# Patient Record
Sex: FEMALE | Race: WHITE | NOT HISPANIC OR LATINO | Employment: OTHER | URBAN - METROPOLITAN AREA
[De-identification: names, ages, dates, MRNs, and addresses within clinical notes are randomized per-mention and may not be internally consistent; named-entity substitution may affect disease eponyms.]

---

## 2017-05-22 PROBLEM — M81.0 AGE-RELATED OSTEOPOROSIS WITHOUT CURRENT PATHOLOGICAL FRACTURE: Status: ACTIVE | Noted: 2017-05-22

## 2017-05-22 PROBLEM — Z78.9 INTOLERANCE OF ORAL BISPHOSPHONATE THERAPY: Status: ACTIVE | Noted: 2017-05-22

## 2017-07-26 ENCOUNTER — LAB VISIT (OUTPATIENT)
Dept: LAB | Facility: HOSPITAL | Age: 82
End: 2017-07-26
Attending: INTERNAL MEDICINE
Payer: MEDICARE

## 2017-07-26 DIAGNOSIS — D64.9 ANEMIA, UNSPECIFIED TYPE: ICD-10-CM

## 2017-07-26 DIAGNOSIS — D64.9 ANEMIA, UNSPECIFIED TYPE: Primary | ICD-10-CM

## 2017-07-26 LAB
ABO + RH BLD: NORMAL
BLD GP AB SCN CELLS X3 SERPL QL: NORMAL

## 2017-07-26 PROCEDURE — 86850 RBC ANTIBODY SCREEN: CPT

## 2017-07-26 PROCEDURE — 36415 COLL VENOUS BLD VENIPUNCTURE: CPT

## 2017-07-26 PROCEDURE — 86900 BLOOD TYPING SEROLOGIC ABO: CPT

## 2017-07-26 PROCEDURE — 86920 COMPATIBILITY TEST SPIN: CPT

## 2017-07-26 PROCEDURE — P9021 RED BLOOD CELLS UNIT: HCPCS

## 2017-07-26 RX ORDER — SODIUM CHLORIDE 0.9 % (FLUSH) 0.9 %
10 SYRINGE (ML) INJECTION
Status: ACTIVE | OUTPATIENT
Start: 2017-07-26

## 2017-07-28 ENCOUNTER — INFUSION (OUTPATIENT)
Dept: INFUSION THERAPY | Facility: HOSPITAL | Age: 82
End: 2017-07-28
Attending: INTERNAL MEDICINE
Payer: MEDICARE

## 2017-07-28 VITALS
DIASTOLIC BLOOD PRESSURE: 57 MMHG | BODY MASS INDEX: 23.65 KG/M2 | SYSTOLIC BLOOD PRESSURE: 115 MMHG | HEART RATE: 76 BPM | RESPIRATION RATE: 20 BRPM | OXYGEN SATURATION: 97 % | WEIGHT: 151 LBS | TEMPERATURE: 97 F

## 2017-07-28 DIAGNOSIS — D64.9 ANEMIA, UNSPECIFIED TYPE: ICD-10-CM

## 2017-07-28 DIAGNOSIS — R09.89 RALES: Primary | ICD-10-CM

## 2017-07-28 LAB
BLD PROD TYP BPU: NORMAL
BLD PROD TYP BPU: NORMAL
BLOOD UNIT EXPIRATION DATE: NORMAL
BLOOD UNIT EXPIRATION DATE: NORMAL
BLOOD UNIT TYPE CODE: 5100
BLOOD UNIT TYPE CODE: 5100
BLOOD UNIT TYPE: NORMAL
BLOOD UNIT TYPE: NORMAL
CODING SYSTEM: NORMAL
CODING SYSTEM: NORMAL
DISPENSE STATUS: NORMAL
DISPENSE STATUS: NORMAL
TRANS ERYTHROCYTES VOL PATIENT: NORMAL ML
TRANS ERYTHROCYTES VOL PATIENT: NORMAL ML

## 2017-07-28 PROCEDURE — 96374 THER/PROPH/DIAG INJ IV PUSH: CPT

## 2017-07-28 PROCEDURE — P9021 RED BLOOD CELLS UNIT: HCPCS

## 2017-07-28 PROCEDURE — 63600175 PHARM REV CODE 636 W HCPCS: Performed by: INTERNAL MEDICINE

## 2017-07-28 PROCEDURE — 36430 TRANSFUSION BLD/BLD COMPNT: CPT

## 2017-07-28 RX ORDER — FUROSEMIDE 10 MG/ML
40 INJECTION INTRAMUSCULAR; INTRAVENOUS ONCE
Status: COMPLETED | OUTPATIENT
Start: 2017-07-28 | End: 2017-07-28

## 2017-07-28 RX ADMIN — FUROSEMIDE 40 MG: 10 INJECTION, SOLUTION INTRAMUSCULAR; INTRAVENOUS at 01:07

## 2017-07-28 NOTE — PLAN OF CARE
Problem: Health Knowledge, Opportunity to Enhance (Adult,NICU,[place],Obstetrics,Pediatric)  Goal: Knowledgeable about Health Subject/Topic  Patient will demonstrate the desired outcomes by discharge/transition of care.  Outcome: Outcome(s) achieved Date Met: 17 0913   Health Knowledge, Opportunity to Enhance (Adult,NICU,[place],Obstetrics,Pediatric)   Knowledgeable about Health Subject/Topic achieves outcome

## 2017-07-28 NOTE — PROGRESS NOTES
tolerated blood transfusion well   More talkative  Color improved.    Report called to April at the [facility]   Awaiting transportation from the nursing [facility]

## 2017-07-28 NOTE — PROGRESS NOTES
Pt cleaned of urine diaper saturated.  Clothing wet.  Skin cleansed new daiper applied with hospital gown

## 2017-07-28 NOTE — PATIENT INSTRUCTIONS
Understanding Blood and Blood Components  Blood is a fluid that flows throughout the body in blood vessels. Blood is needed for life. Blood carries oxygen and nutrients to your organs and tissues and helps remove waste. Blood also helps you fight infections and heal from injuries. This sheet tells you more about blood and its important role in your body.           Blood cells are carried in plasma through the bodys blood vessels.          Blood cells are made in bone marrow, located in the center of bones.      What are the components of blood?  Blood can be broken down into different parts (components). These components include red blood cells, white blood cells, platelets, and plasma.  · Red blood cells (RBCs) carry oxygen to the body. Each RBC lives for about 4 months. RBCs contain a protein called hemoglobin. Hemoglobin allows RBCs to  oxygen from the lungs. Iron is needed to make hemoglobin.  · White blood cells (WBCs) are part of the bodys immune system. WBCs help fight infections and diseases. There are different types of WBCs. These include neutrophils, lymphocytes, monoctyes, eosinophils, and basophils. WBCs live for days, months, or years depending on the specific type.  · Platelets are cells that help with clotting. When you have a cut or bruise, platelets come together to form a clot or plug. This helps to control bleeding, so your body doesnt lose too much blood. Platelets live in the body for about 7 to 10 days.  · Plasma is the liquid portion of blood. It carries the different types of blood cells to all the parts of the body. Plasma also carries proteins called clotting factors. Clotting factors help platelets with the clotting process.  Where is blood made in the body?  Blood and plasma are made in the following ways:   · Blood cells are made in the bone marrow. The bone marrow is the soft, spongy part inside bones. New blood cells are made daily. They help replace the cells that die  naturally or through injury or illness.  · Plasma is made up mostly of water. Plasma is also made up of different proteins, fatty substances, salt, nutrients, vitamins, and hormones.  Date Last Reviewed: 1/27/2016  © 1731-5083 [employer]. [address] [place] [address], [place] [address]. All rights reserved. This information is not intended as a substitute for professional medical care. Always follow your healthcare professional's instructions.

## 2017-09-14 ENCOUNTER — HOSPITAL ENCOUNTER (EMERGENCY)
Facility: HOSPITAL | Age: 82
Discharge: HOME | End: 2017-09-14
Payer: MEDICARE

## 2017-09-14 VITALS
BODY MASS INDEX: 23.96 KG/M2 | SYSTOLIC BLOOD PRESSURE: 156 MMHG | OXYGEN SATURATION: 99 % | HEART RATE: 74 BPM | DIASTOLIC BLOOD PRESSURE: 81 MMHG | TEMPERATURE: 97 F | WEIGHT: 153 LBS | RESPIRATION RATE: 17 BRPM

## 2017-09-14 DIAGNOSIS — D69.6 THROMBOCYTOPENIA: ICD-10-CM

## 2017-09-14 DIAGNOSIS — R04.0 EPISTAXIS: Primary | ICD-10-CM

## 2017-09-14 LAB
ALBUMIN SERPL BCP-MCNC: 2.5 G/DL
ALP SERPL-CCNC: 71 U/L
ALT SERPL W/O P-5'-P-CCNC: 19 U/L
ANION GAP SERPL CALC-SCNC: 9 MMOL/L
ANISOCYTOSIS BLD QL SMEAR: SLIGHT
AST SERPL-CCNC: 19 U/L
BASOPHILS # BLD AUTO: ABNORMAL K/UL
BASOPHILS NFR BLD: 0 %
BILIRUB SERPL-MCNC: 0.5 MG/DL
BUN SERPL-MCNC: 77 MG/DL
CALCIUM SERPL-MCNC: 8.7 MG/DL
CHLORIDE SERPL-SCNC: 102 MMOL/L
CO2 SERPL-SCNC: 23 MMOL/L
CREAT SERPL-MCNC: 1.5 MG/DL
DACRYOCYTES BLD QL SMEAR: ABNORMAL
DIFFERENTIAL METHOD: ABNORMAL
EOSINOPHIL # BLD AUTO: ABNORMAL K/UL
EOSINOPHIL NFR BLD: 5 %
ERYTHROCYTE [DISTWIDTH] IN BLOOD BY AUTOMATED COUNT: 20.4 %
EST. GFR  (AFRICAN AMERICAN): 38 ML/MIN/1.73 M^2
EST. GFR  (NON AFRICAN AMERICAN): 33 ML/MIN/1.73 M^2
GLUCOSE SERPL-MCNC: 114 MG/DL
HCT VFR BLD AUTO: 24.3 %
HGB BLD-MCNC: 7.9 G/DL
HYPOCHROMIA BLD QL SMEAR: ABNORMAL
INR PPP: 1
LYMPHOCYTES # BLD AUTO: ABNORMAL K/UL
LYMPHOCYTES NFR BLD: 52 %
MCH RBC QN AUTO: 31.1 PG
MCHC RBC AUTO-ENTMCNC: 32.5 G/DL
MCV RBC AUTO: 96 FL
MONOCYTES # BLD AUTO: ABNORMAL K/UL
MONOCYTES NFR BLD: 2 %
NEUTROPHILS NFR BLD: 39 %
NEUTS BAND NFR BLD MANUAL: 2 %
OVALOCYTES BLD QL SMEAR: ABNORMAL
PAPPENHEIMER BOD BLD QL SMEAR: ABNORMAL
PLATELET # BLD AUTO: 40 K/UL
PLATELET BLD QL SMEAR: ABNORMAL
PMV BLD AUTO: 10.2 FL
POIKILOCYTOSIS BLD QL SMEAR: SLIGHT
POTASSIUM SERPL-SCNC: 4.8 MMOL/L
PROT SERPL-MCNC: 7.5 G/DL
PROTHROMBIN TIME: 10.3 SEC
RBC # BLD AUTO: 2.54 M/UL
SCHISTOCYTES BLD QL SMEAR: ABNORMAL
SODIUM SERPL-SCNC: 134 MMOL/L
WBC # BLD AUTO: 2.19 K/UL

## 2017-09-14 PROCEDURE — 25000003 PHARM REV CODE 250: Performed by: INTERNAL MEDICINE

## 2017-09-14 PROCEDURE — 80053 COMPREHEN METABOLIC PANEL: CPT

## 2017-09-14 PROCEDURE — 99284 EMERGENCY DEPT VISIT MOD MDM: CPT | Mod: 25

## 2017-09-14 PROCEDURE — 85027 COMPLETE CBC AUTOMATED: CPT

## 2017-09-14 PROCEDURE — 36415 COLL VENOUS BLD VENIPUNCTURE: CPT

## 2017-09-14 PROCEDURE — 30901 CONTROL OF NOSEBLEED: CPT | Mod: RT

## 2017-09-14 PROCEDURE — 85610 PROTHROMBIN TIME: CPT

## 2017-09-14 PROCEDURE — 85007 BL SMEAR W/DIFF WBC COUNT: CPT

## 2017-09-14 RX ORDER — NAPROXEN SODIUM 220 MG/1
81 TABLET, FILM COATED ORAL DAILY
COMMUNITY
Start: 2017-09-14 | End: 2017-09-14 | Stop reason: SINTOL

## 2017-09-14 RX ORDER — CLONIDINE HYDROCHLORIDE 0.1 MG/1
0.2 TABLET ORAL
Status: COMPLETED | OUTPATIENT
Start: 2017-09-14 | End: 2017-09-14

## 2017-09-14 RX ADMIN — CLONIDINE HYDROCHLORIDE 0.2 MG: 0.1 TABLET ORAL at 12:09

## 2017-09-14 NOTE — ED TRIAGE NOTES
Patient bleeding from right nare. Nursing home states this is the second time today. The first was reported to have copious amounts of blood.

## 2017-09-14 NOTE — ED PROVIDER NOTES
Encounter Date: 9/14/2017       History     Chief Complaint   Patient presents with    Epistaxis     82-year-old female presents to the emergency department with epistaxis from right nare.  Nursing home staff state patient had a nosebleed yesterday and today      The history is provided by the patient. No  was used.   Epistaxis    This is a new problem. The current episode started yesterday. The problem occurs 2 - 4 times per day. The problem has been gradually improving. The bleeding has been from the right nare. She has tried nothing for the symptoms. Her past medical history is significant for hypertension and frequent nosebleeds.     Review of patient's allergies indicates:   Allergen Reactions    Percocet [oxycodone-acetaminophen] Shortness Of Breath    Adhesive Dermatitis     Band aid and paper tape for surgical port caused skin rash    Iodinated contrast- oral and iv dye Rash     Past Medical History:   Diagnosis Date    Age-related osteoporosis without current pathological fracture 5/22/2017    Anticoagulant long-term use     Arthritis     Breast cancer     Cancer     left breast    Diabetes mellitus     Dry mouth     Ectropion of right lower eyelid     Heart murmur     Hyperlipidemia     Hypertension     Intolerance of oral bisphosphonate therapy 5/22/2017    Memory deficit     RA (rheumatoid arthritis)     Stroke      Past Surgical History:   Procedure Laterality Date    basal cell corner rt eye      BREAST SURGERY      CAROTID ENDARTERECTOMY Left     ECTROPION REPAIR Right 3/18/2015    EYE SURGERY      MASTECTOMY Left     SKIN CANCER EXCISION      forehead     Family History   Problem Relation Age of Onset    Cancer Mother     Stroke Father     Diabetes Sister     Heart attack Brother     Hypertension Daughter     Hypertension Son     Heart attack Son     Cancer Brother     No Known Problems Maternal Aunt     No Known Problems Maternal Uncle     No  Known Problems Paternal Aunt     No Known Problems Paternal Uncle     No Known Problems Maternal Grandmother     No Known Problems Maternal Grandfather     No Known Problems Paternal Grandmother     No Known Problems Paternal Grandfather     Amblyopia Neg Hx     Blindness Neg Hx     Cataracts Neg Hx     Glaucoma Neg Hx     Macular degeneration Neg Hx     Retinal detachment Neg Hx     Strabismus Neg Hx     Thyroid disease Neg Hx      Social History   Substance Use Topics    Smoking status: Never Smoker    Smokeless tobacco: Never Used    Alcohol use No     Review of Systems   Constitutional: Negative for fever.   HENT: Positive for nosebleeds.    Respiratory: Negative for chest tightness and shortness of breath.    Cardiovascular: Negative for chest pain.   All other systems reviewed and are negative.      Physical Exam     Initial Vitals [09/14/17 0036]   BP Pulse Resp Temp SpO2   (!) 185/96 91 18 98.5 °F (36.9 °C) 99 %      MAP       125.67         Physical Exam    Nursing note and vitals reviewed.  Constitutional: She appears well-developed and well-nourished.   HENT:   Head: Normocephalic and atraumatic.   Right Ear: External ear normal.   Left Ear: External ear normal.   Oozing of blood from right nare   Eyes: EOM are normal.   Neck: Normal range of motion.   Cardiovascular: Normal rate and regular rhythm.   Pulmonary/Chest: Breath sounds normal. No respiratory distress.   Abdominal: She exhibits no distension.   Musculoskeletal: Normal range of motion.   Neurological: She is alert.   Skin: Skin is warm and dry.   Psychiatric: She has a normal mood and affect.         ED Course   Epistaxis Mgmt  Date/Time: 9/14/2017 1:49 AM  Performed by: CORBY BREAUX  Authorized by: CORBY BREAUX   Consent Done: Yes  Consent: Verbal consent obtained.  Consent given by: patient  Patient understanding: patient states understanding of the procedure being performed  Patient consent: the patient's  understanding of the procedure matches consent given  Procedure consent: procedure consent matches procedure scheduled  Patient sedated: no  Treatment site: right anterior  Repair method: anterior pack  Post-procedure assessment: bleeding stopped  Treatment complexity: simple  Patient tolerance: Patient tolerated the procedure well with no immediate complications        Labs Reviewed   CBC W/ AUTO DIFFERENTIAL   COMPREHENSIVE METABOLIC PANEL   PROTIME-INR             Medical Decision Making:   Initial Assessment:   82-year-old female presents to the emergency department with epistaxis from right nare.  Nursing home staff state patient had a nosebleed yesterday and today  Differential Diagnosis:   Epistaxis  ED Management:  Right nare was packed with gauze.  However, bleeding had resolved prior to packing.  CBC revealed a downward trending platelet count since July 2017, possibly secondary to history of cancer.  Patient has chronic thrombocytopenia, however platelet count was decreased.  Patient was discharged with instructions for epistaxis and should follow-up with primary care physician tomorrow for reevaluation and possible referral to hematology for further evaluation for causes of thrombocytopenia.                   ED Course      Clinical Impression:   The encounter diagnosis was Epistaxis.    Disposition:   Disposition: Discharged  Condition: Stable                        Barak Helms MD  09/14/17 0150       Barak Helms MD  09/14/17 0550